# Patient Record
Sex: FEMALE | Race: OTHER | NOT HISPANIC OR LATINO | URBAN - METROPOLITAN AREA
[De-identification: names, ages, dates, MRNs, and addresses within clinical notes are randomized per-mention and may not be internally consistent; named-entity substitution may affect disease eponyms.]

---

## 2017-12-15 ENCOUNTER — APPOINTMENT (RX ONLY)
Dept: URBAN - METROPOLITAN AREA CLINIC 170 | Facility: CLINIC | Age: 27
Setting detail: DERMATOLOGY
End: 2017-12-15

## 2017-12-15 VITALS — HEART RATE: 85 BPM | DIASTOLIC BLOOD PRESSURE: 103 MMHG | SYSTOLIC BLOOD PRESSURE: 140 MMHG

## 2017-12-15 VITALS — SYSTOLIC BLOOD PRESSURE: 112 MMHG | HEART RATE: 92 BPM | DIASTOLIC BLOOD PRESSURE: 83 MMHG

## 2017-12-15 DIAGNOSIS — L70.0 ACNE VULGARIS: ICD-10-CM | Status: WELL CONTROLLED

## 2017-12-15 PROCEDURE — ? COUNSELING

## 2017-12-15 PROCEDURE — ? TREATMENT REGIMEN

## 2017-12-15 PROCEDURE — 99212 OFFICE O/P EST SF 10 MIN: CPT

## 2017-12-15 PROCEDURE — ? PRESCRIPTION

## 2017-12-15 RX ORDER — SPIRONOLACTONE 50 MG/1
TABLET, FILM COATED ORAL
Qty: 60 | Refills: 6 | Status: ERX

## 2017-12-15 ASSESSMENT — LOCATION DETAILED DESCRIPTION DERM: LOCATION DETAILED: LEFT INFERIOR CENTRAL MALAR CHEEK

## 2017-12-15 ASSESSMENT — LOCATION SIMPLE DESCRIPTION DERM: LOCATION SIMPLE: LEFT CHEEK

## 2017-12-15 ASSESSMENT — LOCATION ZONE DERM: LOCATION ZONE: FACE

## 2017-12-15 NOTE — PROCEDURE: TREATMENT REGIMEN
Plan: Patient will try to decrease to 25mg/day
Continue Regimen: Spironolactone 50 mg daily may increase to twice daily for flares \\nClindamycin 1% lotion daily as needed
Detail Level: Simple